# Patient Record
(demographics unavailable — no encounter records)

---

## 2025-03-26 NOTE — END OF VISIT
[FreeTextEntry3] : Documented by Alejo Guaman acting solely as a scribe for Dr. Brannon Spencer on this date 03/26/2025.   All Medical record entries made by the Scribe were at my, Dr. Brannon Spencer's, direction and personally dictated by me on 03/26/2025. I have reviewed the chart and agree that the record accurately reflects my personal performance of the history, physical exam, assessment and plan. I have also personally directed, reviewed, and agreed with the discharge instructions.

## 2025-03-26 NOTE — HISTORY OF PRESENT ILLNESS
[de-identified] : Patient is 62-year-old male here for evaluation of bilateral knee pain.  Patient works as a  for 35 years recently retired about 2 years ago.  Since then, he has had worsening bilateral knee pain. He notes worsening symptom since September 2024. He reports the pain is worse when he goes from a seated to a standing position and feels a weakness in both quads and pain associated with prolonged walking or exercise.  He feels weakness in the knees and gets intermittent shooting pain. He has not had previous injections or surgery on the knees.  He reports that he gained 25 lbs over the past 2 years. He does have history of cardiac stents is not currently anticoagulated.

## 2025-03-26 NOTE — DISCUSSION/SUMMARY
[de-identified] : This is a 62 year old M pt presents today for evaluation of b/l knee pain. His x-ray shows well preserved joint spaces. The nature of condition and treatment options were discussed in detail. Patient is not a candidate for b/l TKA. Patient does c/o weakness in the knee with weightbearing. I sent a script for physical therapy. He should continue to do low impact exercises. If he doesn't note improvement in 6 weeks, we will order MRI for his knees to better understand the etiology. F/u PRN.

## 2025-03-26 NOTE — PHYSICAL EXAM
[de-identified] :  GENERAL APPEARANCE: Well nourished and hydrated, pleasant, alert, and oriented x 3. Appears their stated age.  HEENT: Normocephalic, extraocular eye motion intact. Nasal septum midline. Oral cavity clear. External auditory canal clear.  RESPIRATORY: Breath sounds clear and audible in all lobes. No wheezing, No accessory muscle use. CARDIOVASCULAR: No apparent abnormalities. No lower leg edema. No varicosities. Pedal pulses are palpable. NEUROLOGIC: Sensation is normal, no muscle weakness in the upper or lower extremities. DERMATOLOGIC: No apparent skin lesions, moist, warm, no rash. SPINE: Cervical spine appears normal and moves freely; thoracic spine appears normal and moves freely; lumbosacral spine appears normal and moves freely, normal, nontender. MUSCULOSKELETAL: Hands, wrists, and elbows are normal and move freely, shoulders are normal and move freely.   [de-identified] : Bilateral knee exam shows no effusion, no instability, no jointline tenderness. ROM 0-120 Bilateral hip exam shows no pain with SLR. [de-identified] : 4V xray of the bilateral knee done in the office today and reviewed by Dr. Brannon Spencer demonstrates well preserved joint spaces.